# Patient Record
Sex: FEMALE | Race: AMERICAN INDIAN OR ALASKA NATIVE | NOT HISPANIC OR LATINO | Employment: UNEMPLOYED | URBAN - METROPOLITAN AREA
[De-identification: names, ages, dates, MRNs, and addresses within clinical notes are randomized per-mention and may not be internally consistent; named-entity substitution may affect disease eponyms.]

---

## 2017-02-17 ENCOUNTER — HOSPITAL ENCOUNTER (EMERGENCY)
Facility: HOSPITAL | Age: 52
Discharge: HOME | End: 2017-02-17
Attending: SURGERY
Payer: MEDICAID

## 2017-02-17 VITALS
OXYGEN SATURATION: 97 % | HEIGHT: 63 IN | BODY MASS INDEX: 31.18 KG/M2 | TEMPERATURE: 97 F | RESPIRATION RATE: 20 BRPM | DIASTOLIC BLOOD PRESSURE: 86 MMHG | HEART RATE: 82 BPM | WEIGHT: 176 LBS | SYSTOLIC BLOOD PRESSURE: 160 MMHG

## 2017-02-17 DIAGNOSIS — I10 ESSENTIAL HYPERTENSION: ICD-10-CM

## 2017-02-17 DIAGNOSIS — F41.9 ANXIETY: ICD-10-CM

## 2017-02-17 DIAGNOSIS — R07.89 NON-CARDIAC CHEST PAIN: Primary | ICD-10-CM

## 2017-02-17 LAB
ALBUMIN SERPL BCP-MCNC: 3.4 G/DL
ALP SERPL-CCNC: 96 U/L
ALT SERPL W/O P-5'-P-CCNC: 17 U/L
ANION GAP SERPL CALC-SCNC: 8 MMOL/L
AST SERPL-CCNC: 17 U/L
BASOPHILS # BLD AUTO: 0.02 K/UL
BASOPHILS NFR BLD: 0.2 %
BILIRUB SERPL-MCNC: 0.5 MG/DL
BNP SERPL-MCNC: 112 PG/ML
BUN SERPL-MCNC: 12 MG/DL
CALCIUM SERPL-MCNC: 8.9 MG/DL
CHLORIDE SERPL-SCNC: 107 MMOL/L
CO2 SERPL-SCNC: 25 MMOL/L
CREAT SERPL-MCNC: 0.8 MG/DL
DIFFERENTIAL METHOD: ABNORMAL
EOSINOPHIL # BLD AUTO: 0.1 K/UL
EOSINOPHIL NFR BLD: 1.1 %
ERYTHROCYTE [DISTWIDTH] IN BLOOD BY AUTOMATED COUNT: 15.3 %
EST. GFR  (AFRICAN AMERICAN): >60 ML/MIN/1.73 M^2
EST. GFR  (NON AFRICAN AMERICAN): >60 ML/MIN/1.73 M^2
GLUCOSE SERPL-MCNC: 96 MG/DL
HCT VFR BLD AUTO: 39.5 %
HGB BLD-MCNC: 13 G/DL
INR PPP: 1
LYMPHOCYTES # BLD AUTO: 2.4 K/UL
LYMPHOCYTES NFR BLD: 24.7 %
MCH RBC QN AUTO: 27.3 PG
MCHC RBC AUTO-ENTMCNC: 32.9 %
MCV RBC AUTO: 83 FL
MONOCYTES # BLD AUTO: 0.7 K/UL
MONOCYTES NFR BLD: 7.4 %
NEUTROPHILS # BLD AUTO: 6.4 K/UL
NEUTROPHILS NFR BLD: 66.6 %
PLATELET # BLD AUTO: 270 K/UL
PMV BLD AUTO: 11 FL
POTASSIUM SERPL-SCNC: 4.2 MMOL/L
PROT SERPL-MCNC: 7.1 G/DL
PROTHROMBIN TIME: 9.6 SEC
RBC # BLD AUTO: 4.77 M/UL
SODIUM SERPL-SCNC: 140 MMOL/L
TROPONIN I SERPL DL<=0.01 NG/ML-MCNC: <0.006 NG/ML
WBC # BLD AUTO: 9.61 K/UL

## 2017-02-17 PROCEDURE — 93005 ELECTROCARDIOGRAM TRACING: CPT

## 2017-02-17 PROCEDURE — 96374 THER/PROPH/DIAG INJ IV PUSH: CPT

## 2017-02-17 PROCEDURE — 93010 ELECTROCARDIOGRAM REPORT: CPT | Mod: ,,, | Performed by: INTERNAL MEDICINE

## 2017-02-17 PROCEDURE — 84484 ASSAY OF TROPONIN QUANT: CPT

## 2017-02-17 PROCEDURE — 36415 COLL VENOUS BLD VENIPUNCTURE: CPT

## 2017-02-17 PROCEDURE — 80053 COMPREHEN METABOLIC PANEL: CPT

## 2017-02-17 PROCEDURE — 25000003 PHARM REV CODE 250: Performed by: SURGERY

## 2017-02-17 PROCEDURE — 85610 PROTHROMBIN TIME: CPT

## 2017-02-17 PROCEDURE — 99284 EMERGENCY DEPT VISIT MOD MDM: CPT | Mod: 25

## 2017-02-17 PROCEDURE — 85025 COMPLETE CBC W/AUTO DIFF WBC: CPT

## 2017-02-17 PROCEDURE — 63600175 PHARM REV CODE 636 W HCPCS: Performed by: SURGERY

## 2017-02-17 PROCEDURE — 83880 ASSAY OF NATRIURETIC PEPTIDE: CPT

## 2017-02-17 RX ORDER — ASPIRIN 325 MG
325 TABLET ORAL
Status: COMPLETED | OUTPATIENT
Start: 2017-02-17 | End: 2017-02-17

## 2017-02-17 RX ORDER — CLONIDINE HYDROCHLORIDE 0.1 MG/1
0.1 TABLET ORAL 2 TIMES DAILY
Qty: 30 TABLET | Refills: 0 | Status: SHIPPED | OUTPATIENT
Start: 2017-02-17 | End: 2017-03-24

## 2017-02-17 RX ORDER — CLONIDINE HYDROCHLORIDE 0.1 MG/1
0.2 TABLET ORAL
Status: COMPLETED | OUTPATIENT
Start: 2017-02-17 | End: 2017-02-17

## 2017-02-17 RX ADMIN — MORPHINE SULFATE 2 MG: 2 INJECTION, SOLUTION INTRAMUSCULAR; INTRAVENOUS at 03:02

## 2017-02-17 RX ADMIN — CLONIDINE HYDROCHLORIDE 0.2 MG: 0.1 TABLET ORAL at 03:02

## 2017-02-17 RX ADMIN — ASPIRIN 325 MG ORAL TABLET 325 MG: 325 PILL ORAL at 03:02

## 2017-02-17 RX ADMIN — NITROGLYCERIN 1 INCH: 20 OINTMENT TOPICAL at 03:02

## 2017-02-17 NOTE — ED PROVIDER NOTES
[facility]                                                         Chief Complaint  51 y.o. female with Chest Pain (for one wk intermitently)    History of Present Illness  Jaiden Mckeon presents to the emergency room with chest pain and poorly controlled blood pressure   for over a week.  She states she is taking her medications as prescribed.   She is afraid that her blood sugar   Is high even though she has no history of diabetes. She is unable to describe aggravating or alleviatiang   Circumstances. She states that the pain is retrosternal. There is no nausea or vomiting. No fever or chills.   No radiation of pain.No weakness or diaphoresis. She has had this pain before. There is a history of high   blood pressure and myocardial infarction in her mother. Patient herself has never had a heart attack.      Past Medical History:   Diagnosis Date    Encounter for blood transfusion 80's    Hypertension      Past Surgical History:   Procedure Laterality Date    APPENDECTOMY      BREAST CYST EXCISION Left 12/2012    benign    CHOLECYSTECTOMY      TUBAL LIGATION        Review of patient's allergies indicates:   Allergen Reactions    Norvasc [amlodipine] Swelling     Feet swelling        Review of Systems and Physical Exam     Review of Systems  -- Constitution - no fever, denies fatigue, no weakness, no chills  -- Eyes - no tearing or redness, no visual disturbance  -- Ear, Nose - no tinnitus or earache, no nasal congestion or discharge  -- Mouth,Throat - no sore throat, no toothache, normal voice, normal swallowing  -- Respiratory - denies cough and congestion, no shortness of breath, no JENKINS  -- Cardiovascular - denies chest pain, no palpitations, denies claudication  -- Gastrointestinal - denies abdominal pain, nausea, vomiting, or diarrhea  -- Genitourinary - no dysuria, no denies flank pain, no hematuria or frequency   -- Musculoskeletal - denies back pain, negative for myalgias and  "arthralgias   -- Neurological - no headache, denies weakness or seizure; no LOC  -- Skin - denies pallor, rash, or changes in skin. no hives or welts noted    Vital Signs   height is 5' 3" (1.6 m) and weight is 79.8 kg (176 lb). Her oral temperature is 97 °F (36.1 °C). Her blood pressure is 160/86 (abnormal) and her pulse is 82. Her respiration is 20 and oxygen saturation is 97%.      Physical Exam  -- Nursing note and vitals reviewed  -- Constitutional: Appears well-developed and well-nourished  -- Head: Atraumatic. Normocephalic. No obvious abnormality  -- Eyes: Pupils are equal and reactive to light. Normal conjunctiva and lids  -- Nose: Nose normal in appearance, nares grossly normal. No discharge  -- Throat: Mucous membranes moist, pharynx normal, normal tonsils. No lesions   -- Ears: External ears and TM normal bilaterally. Normal hearing and no drainage  -- Neck: Normal range of motion. Neck supple. No masses, trachea midline  -- Cardiac: Normal rate, regular rhythm and normal heart sounds  -- Pulmonary: Normal respiratory effort, breath sounds clear to auscultation  -- Abdominal: Soft, no tenderness. Normal bowel sounds. Normal liver edge  -- Musculoskeletal: Normal range of motion, no effusions. Joints stable   -- Neurological: No focal deficits. Showed good interaction with staff  -- Vascular: Posterior tibial, dorsalis pedis and radial pulses 2+ bilaterally    -- Lymphatics: No cervical or peripheral lymphadenopathy. No edema noted  -- Skin: Warm and dry. No evidence of rash or cellulitis  -- Psychiatric: Normal mood and affect. Bedside behavior is appropriate    Emergency Room Course     Lab values  Labs Reviewed   CBC W/ AUTO DIFFERENTIAL - Abnormal; Notable for the following:        Result Value    RDW 15.3 (*)     All other components within normal limits   COMPREHENSIVE METABOLIC PANEL - Abnormal; Notable for the following:     Albumin 3.4 (*)     All other components within normal limits   B-TYPE " "NATRIURETIC PEPTIDE - Abnormal; Notable for the following:      (*)     All other components within normal limits   PROTIME-INR   TROPONIN I   CBC and CMP within normal limits. Her glucose is normal at 96.  INR 1.0    Troponin less than .006  EKG normal sinus rhythm of the rate of 70, no ST changes, normal intervals.  Chest x-ray no acute pulmonary disease.    Medications Given  Medications   aspirin tablet 325 mg (325 mg Oral Given 2/17/17 1525)   nitroGLYCERIN 2% TD oint ointment 1 inch (1 inch Topical Given 2/17/17 1525)   morphine injection 2 mg (2 mg Intravenous Given 2/17/17 1526)   cloNIDine tablet 0.2 mg (0.2 mg Oral Given 2/17/17 1525)       ED Management  -- MDM: noncardiac chest pain. Patient has a history of hypertension. Her anxiety about her health worsens her  "chest pain." She states she is taking her medications as prescribed. Blood pressure is elevated however.   She had a good response to clonidine. Support and encouragement. Follow up with PCP. PCP will decide   whether she should have a definitive change in her blood pressure medications.    Diagnosis  -- The primary encounter diagnosis was Non-cardiac chest pain. Diagnoses of Essential hypertension and Anxiety were also pertinent to this visit.    Disposition and Plan  -- Disposition: home  -- Condition: stable     Lauryn Burkett MD  03/02/17 9295    "

## 2017-02-17 NOTE — ED AVS SNAPSHOT
[facility]  [address] [address]               Jaiden Mckeon   2017  2:52 PM   ED    Description:  Female : 1965   Department:  [facility]           Your Care was Coordinated By:     Provider Role From To    Lauryn Burkett MD Attending Provider 17 1453 --      Reason for Visit     Chest Pain           Diagnoses this Visit        Comments    Non-cardiac chest pain    -  Primary     Essential hypertension         Anxiety           ED Disposition     ED Disposition Condition Comment    Discharge             To Do List           Follow-up Information     Follow up with Hermann Mcadams MD In 1 week.    Specialty:  Internal Medicine    Contact information:     [facility] [address]  [phone]         These Medications        Disp Refills Start End    cloNIDine (CATAPRES) 0.1 MG tablet 30 tablet 0 2017    Take 1 tablet (0.1 mg total) by mouth 2 (two) times daily. - Oral    Pharmacy: [facility] [place], [place] -  [address] [facility] Ph #: [phone]         [facility]     [facility] -  Assistance  Registered nurses in the [facility] provide clinical advisement, health education, appointment booking, and other advisory services.  Call for this free service at [phone].             Medications           START taking these NEW medications        Refills    cloNIDine (CATAPRES) 0.1 MG tablet 0    Sig: Take 1 tablet (0.1 mg total) by mouth 2 (two) times daily.    Class: Print    Route: Oral      These medications were administered today        Dose Freq    aspirin tablet 325 mg 325 mg ED 1 Time    Sig: Take 1 tablet (325 mg total) by mouth ED 1 Time.    Class: Normal    Route: Oral    nitroGLYCERIN 2% TD oint ointment 1 inch 1 inch ED 1 Time    Sig: Apply 1 inch topically ED 1 Time.    Class: Normal    Route: Topical    morphine injection 2 mg 2  "mg ED 1 Time    Sig: Inject 1 mL (2 mg total) into the vein ED 1 Time.    Class: Normal    Route: Intravenous    cloNIDine tablet 0.2 mg 0.2 mg ED 1 Time    Sig: Take 2 tablets (0.2 mg total) by mouth ED 1 Time.    Class: Normal    Route: Oral           Verify that the below list of medications is an accurate representation of the medications you are currently taking.  If none reported, the list may be blank. If incorrect, please contact your healthcare provider. Carry this list with you in case of emergency.           Current Medications     carvedilol (COREG) 25 MG tablet Take 1 tablet (25 mg total) by mouth 2 (two) times daily with meals.    hydrochlorothiazide (MICROZIDE) 12.5 mg capsule Take 1 capsule (12.5 mg total) by mouth once daily.    cloNIDine (CATAPRES) 0.1 MG tablet Take 1 tablet (0.1 mg total) by mouth 2 (two) times daily.    cloNIDine tablet 0.2 mg Take 2 tablets (0.2 mg total) by mouth ED 1 Time.           Clinical Reference Information           Your Vitals Were     BP Pulse Temp Resp Height Weight    169/96 72 97 °F (36.1 °C) (Oral) 20 5' 3" (1.6 m) 79.8 kg (176 lb)    SpO2 BMI             97% 31.18 kg/m2         Allergies as of 2/17/2017        Reactions    Norvasc [Amlodipine] Swelling    Feet swelling      Immunizations Administered on Date of Encounter - 2/17/2017     None      ED Micro, Lab, POCT     Start Ordered       Status Ordering Provider    02/17/17 1516 02/17/17 1516  CBC auto differential  STAT      Final result     02/17/17 1516 02/17/17 1516  Comprehensive metabolic panel  STAT      In process     02/17/17 1516 02/17/17 1516  Protime-INR  STAT      Final result     02/17/17 1516 02/17/17 1516  Troponin I  Now then every 3 hours     Start Status   02/17/17 1516 Final result   02/17/17 1816 Acknowledged       Acknowledged     02/17/17 1516 02/17/17 1516  B-Type natriuretic peptide (BNP)  STAT      Final result       ED Imaging Orders     Start Ordered       Status Ordering Provider "    02/17/17 1516 02/17/17 1516  X-Ray Chest AP Portable  1 time imaging      Final result       Your Scheduled Appointments     Mar 08, 2017  7:40 AM CST   Fasting Lab with [facility]   [facility]    [address] [address]   [phone]            Mar 15, 2017 10:00 AM CDT   Established Patient Visit with MD PATRICIO Burnett - Continuing Care ([facility])    [address] [address]   [phone]            Mar 24, 2017  8:30 AM CDT   Preventive Care Screening with BREAST/CERVICAL SCREENING NURSE, KISHOR Patiño - Preventative Care ([facility])    [address] [address]   [phone]            Apr 25, 2017  9:15 AM CDT   Mammo Screening with BERNARDINO MAMMO1   [facility]    [address] [address]   [phone]               [facility] complies with applicable Federal civil rights laws and does not discriminate on the basis of race, color, national origin, age, disability, or sex.        Language Assistance Services     ATTENTION: Language assistance services are available, free of charge. Please call [phone].      ATENCIÓN: Si habla español, tiene a flaherty disposición servicios gratuitos de asistencia lingüística. Llame al [phone].     CHÚ Ý: N?u b?n nói Ti?ng Vi?t, có các d?ch v? h? tr? ngôn ng? mi?n phí dành cho b?n. G?i s? [phone].

## 2017-08-24 PROBLEM — N20.0 NEPHROLITHIASIS: Status: ACTIVE | Noted: 2017-08-24

## 2018-04-30 PROBLEM — R00.2 PALPITATIONS: Status: ACTIVE | Noted: 2018-04-30

## 2018-04-30 PROBLEM — E66.09 CLASS 1 OBESITY DUE TO EXCESS CALORIES WITHOUT SERIOUS COMORBIDITY WITH BODY MASS INDEX (BMI) OF 34.0 TO 34.9 IN ADULT: Status: ACTIVE | Noted: 2018-04-30

## 2018-04-30 PROBLEM — R07.9 CHEST PAIN: Status: ACTIVE | Noted: 2018-04-30

## 2018-04-30 PROBLEM — R06.09 DYSPNEA ON EXERTION: Status: ACTIVE | Noted: 2018-04-30

## 2018-04-30 PROBLEM — K21.9 GASTROESOPHAGEAL REFLUX DISEASE WITHOUT ESOPHAGITIS: Status: ACTIVE | Noted: 2018-04-30

## 2018-04-30 PROBLEM — Z82.49 FAMILY HISTORY OF CARDIOVASCULAR DISEASE: Status: ACTIVE | Noted: 2018-04-30

## 2018-04-30 PROBLEM — Z12.11 COLON CANCER SCREENING: Status: ACTIVE | Noted: 2018-04-30

## 2018-09-05 PROBLEM — Z12.11 SCREENING FOR COLON CANCER: Status: ACTIVE | Noted: 2018-09-05

## 2019-01-08 PROBLEM — N95.0 POSTMENOPAUSAL BLEEDING: Status: ACTIVE | Noted: 2019-01-08

## 2019-04-25 PROBLEM — N20.0 NEPHROLITHIASIS: Status: RESOLVED | Noted: 2017-08-24 | Resolved: 2019-04-25

## 2019-05-10 PROBLEM — Z12.11 SCREENING FOR COLON CANCER: Status: RESOLVED | Noted: 2018-09-05 | Resolved: 2019-05-10

## 2019-05-10 PROBLEM — Z87.442 HISTORY OF KIDNEY STONES: Status: ACTIVE | Noted: 2019-05-10

## 2019-05-10 PROBLEM — K21.9 GASTROESOPHAGEAL REFLUX DISEASE WITHOUT ESOPHAGITIS: Chronic | Status: ACTIVE | Noted: 2018-04-30

## 2019-05-10 PROBLEM — Z12.11 COLON CANCER SCREENING: Status: RESOLVED | Noted: 2018-04-30 | Resolved: 2019-05-10

## 2019-05-13 PROBLEM — Z98.890 S/P DILATATION AND CURETTAGE: Status: ACTIVE | Noted: 2019-05-13
